# Patient Record
Sex: FEMALE | Race: BLACK OR AFRICAN AMERICAN
[De-identification: names, ages, dates, MRNs, and addresses within clinical notes are randomized per-mention and may not be internally consistent; named-entity substitution may affect disease eponyms.]

---

## 2021-11-16 PROBLEM — Z00.00 ENCOUNTER FOR PREVENTIVE HEALTH EXAMINATION: Status: ACTIVE | Noted: 2021-11-16

## 2021-11-17 ENCOUNTER — APPOINTMENT (OUTPATIENT)
Dept: CARDIOLOGY | Facility: CLINIC | Age: 71
End: 2021-11-17
Payer: MEDICARE

## 2021-11-17 DIAGNOSIS — Z82.41 FAMILY HISTORY OF SUDDEN CARDIAC DEATH: ICD-10-CM

## 2021-11-17 DIAGNOSIS — Z82.49 FAMILY HISTORY OF ISCHEMIC HEART DISEASE AND OTHER DISEASES OF THE CIRCULATORY SYSTEM: ICD-10-CM

## 2021-11-17 PROCEDURE — 99204 OFFICE O/P NEW MOD 45 MIN: CPT | Mod: 95

## 2021-12-08 NOTE — REASON FOR VISIT
[FreeTextEntry3] : STELLA LAZARUS  is being seen  for an initial consultation at the [facility] at [facility] on 11/17/2021.   Ms. 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

## 2021-12-08 NOTE — FAMILY HISTORY
[FreeTextEntry1] : A four-generation family history was constructed and scanned into [employer]. \par Family history is significant for: \par siblings\par 1 half brother dec 50s - shared mother, hx asthma, SOB "used to gasp" - 2 sons and 3 daughters , one daughters dec AIDS, one daughter dec 40s heart prob \par \par 8 full siblings\par 2 sisters\par sisters dec 50s leiomyosarcoma- chemo induced CM- 3 daughters 49 yo twin daughters possible identical, 49 yo daughter (sickle cell carrier) all healthy and 1 son 40 yo healthy\par 68 yo sister HTN , 2 daughters healthy\par \par 6 brothers\par  brother dec 44 yo- parasitic infection ([place] )2 daughters and 1 son healthy\par brother 75 yo, born without an ear, nub , folded ear, DM ,HTN , HLD , chronic back problems due being run over in times sq- 2 daughters 50s healthy\par brother SCD 41 yo playing heated game of table tennis - 1 son30 yo and 1 daughter 36 yo\par brother 60s - med hx unk, no children\par brother 19 yo dec accident, ? poisoning- no children\par brother  74 yo PMH NICM due to LVNC LVEF 25-30%, mild non-obstructive CAD, DM ty2, HTN\par LVNC DSP variant:  41 yo daughter- asthma, Hashimoto's, knee prob , nml ECHO, palps with stress- 5 yo daughter no med probs\par  40 yo brother- healthy, bells palsy as a child- football injury obesity, nml echo yrs ago- no children\par 45 yo daughter preDM, gestational HTN, inguinal hernia baby s/p repair, nml ECHO- 12 yo son ADHD , 20 yo \par \par \par \par  brother with SCD in his 40's (no autopsy).\par \par Mother dec 74 yo hx headaches, slight heart prob- undiagnosed, DM , chronic migranes\par Maternal aunts 1/2 sister x2 shared mother dec 80s , aneurysm cerebral, asthma home o2, other dec pulm issues \par Maternal uncles none\par Maternal Grandmother dec unk\par Maternal Grandfather dec unk\par \par Father dec 69 yo CVA, HTN, 2nd cva with death\par Paternal aunts none\par Paternal uncles x2 unk med hx likely dec\par Paternal Grandfather dec\par Paternal Grandmother from [place]\par \par \par \par children\par \par 37 yo son healthy- HTN - daughter 4M healthy\par 30 yo son healthy - 1 yo daughter healthy\par \par her maternal families originate from [facility] and paternal families originate from [place]\par No Ashkenazi Religion ancestry. \par Family history was negative for consanguinity \par No family history of SIDS\par  \par  [FreeTextEntry2] : Chloe  [FreeTextEntry3] : [place], [facility]

## 2021-12-08 NOTE — HISTORY OF PRESENT ILLNESS
[FreeTextEntry1] : STELLA LAZARUS is a 72 yo F PMH arthritis, HTn, hx heart murmur\par when she had her first child had irregular heart beats, weak heart , NICM, \par next pregnancy further irregular heartbeats\par \par placed on telmesartan, hctz prior \par \par placed on atenolol 25mg po now\par \par today she is referred for a cardiogenomic evaluation

## 2021-12-08 NOTE — REVIEW OF SYSTEMS
[Palpitations] : palpitations [Arrhythmia] : arrhythmia [Hypertension] : hypertension [Negative] : Constitutional [Cyanosis] : no cyanosis [Edema] : no edema [Diaphoresis] : no diaphoresis [Chest Pain] : no chest pain [Hypotension] : no hypotension [Exercise Intolerance] : no exercise intolerance [FreeTextEntry9] : pain in right ankle, swollen finger [de-identified] : callus on heels

## 2021-12-09 PROBLEM — Z82.41 FAMILY HISTORY OF SUDDEN CARDIAC DEATH (SCD): Status: ACTIVE | Noted: 2021-12-09

## 2022-06-15 ENCOUNTER — APPOINTMENT (OUTPATIENT)
Dept: CARDIOLOGY | Facility: CLINIC | Age: 72
End: 2022-06-15
Payer: MEDICARE

## 2022-06-15 PROCEDURE — 99215 OFFICE O/P EST HI 40 MIN: CPT | Mod: 95

## 2022-06-15 NOTE — REVIEW OF SYSTEMS
[Palpitations] : palpitations [Arrhythmia] : arrhythmia [Hypertension] : hypertension [Negative] : Constitutional [Cyanosis] : no cyanosis [Edema] : no edema [Diaphoresis] : no diaphoresis [Chest Pain] : no chest pain [Hypotension] : no hypotension [Exercise Intolerance] : no exercise intolerance [FreeTextEntry9] : pain in right ankle, swollen finger [de-identified] : callus on heels

## 2022-06-15 NOTE — HISTORY OF PRESENT ILLNESS
[Home] : at home, [unfilled] , at the time of the visit. [Medical Office: ([facility])___] : at the medical office located in  [Verbal consent obtained from patient] : the patient, [unfilled] [FreeTextEntry1] : STELLA LAZARUS is a 70 yo F PMH arthritis, HTn, hx heart murmur\par when she had her first child had irregular heart beats, weak heart , NICM, \par next pregnancy further irregular heartbeats\par \par placed on telmesartan, hctz prior \par \par placed on atenolol 25mg po now, telmesartan,  and HCTz\par \par She underwent genetic testing and today presents for results

## 2022-06-15 NOTE — FAMILY HISTORY
[FreeTextEntry1] : A four-generation family history was constructed and scanned into [email or web address]. \par Family history is significant for: \par siblings\par 1 half brother dec 50s - shared mother, hx asthma, SOB "used to gasp" - 2 sons and 3 daughters , one daughters dec AIDS, one daughter dec 40s heart prob \par \par 8 full siblings\par 2 sisters\par sisters dec 50s leiomyosarcoma- chemo induced CM- 3 daughters 49 yo twin daughters possible identical, 49 yo daughter (sickle cell carrier) all healthy and 1 son 40 yo healthy\par 68 yo sister HTN , 2 daughters healthy\par \par 6 brothers\par  brother dec 44 yo- parasitic infection ([place] )2 daughters and 1 son healthy\par brother 75 yo, born without an ear, nub , folded ear, DM ,HTN , HLD , chronic back problems due being run over in times sq- 2 daughters 50s healthy\par brother SCD 41 yo playing heated game of table tennis - 1 son30 yo and 1 daughter 36 yo\par brother 60s - med hx unk, no children\par brother 17 yo dec accident, ? poisoning- no children\par brother  72 yo PMH NICM due to LVNC LVEF 25-30%, mild non-obstructive CAD, DM ty2, HTN\par LVNC DSP variant:  41 yo daughter- asthma, Hashimoto's, knee prob , nml ECHO, palps with stress- 7 yo daughter no med probs\par  38 yo brother- healthy, bells palsy as a child- football injury obesity, nml echo yrs ago- no children\par 43 yo daughter preDM, gestational HTN, inguinal hernia baby s/p repair, nml ECHO- 12 yo son ADHD , 20 yo \par \par \par \par  brother with SCD in his 40's (no autopsy).\par \par Mother dec 76 yo hx headaches, slight heart prob- undiagnosed, DM , chronic migranes\par Maternal aunts 1/2 sister x2 shared mother dec 80s , aneurysm cerebral, asthma home o2, other dec pulm issues \par Maternal uncles none\par Maternal Grandmother dec unk\par Maternal Grandfather dec unk\par \par Father dec 71 yo CVA, HTN, 2nd cva with death\par Paternal aunts none\par Paternal uncles x2 unk med hx likely dec\par Paternal Grandfather dec\par Paternal Grandmother from [place]\par \par \par \par children\par \par 35 yo son healthy- HTN - daughter 4M healthy\par 30 yo son healthy - 3 yo daughter healthy\par \par her maternal families originate from [facility] and paternal families originate from [place]\par No Ashkenazi Faith ancestry. \par Family history was negative for consanguinity \par No family history of SIDS\par  \par  [FreeTextEntry2] : Chloe  [FreeTextEntry3] : [place], [facility]

## 2022-06-15 NOTE — REASON FOR VISIT
[FreeTextEntry3] : STELLA LAZARUS  was  seen  for an initial consultation at the Cardiogenomics Program at [facility] on 11/17/2021.   Ms. 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